# Patient Record
Sex: FEMALE | Race: WHITE
[De-identification: names, ages, dates, MRNs, and addresses within clinical notes are randomized per-mention and may not be internally consistent; named-entity substitution may affect disease eponyms.]

---

## 2017-08-01 ENCOUNTER — HOSPITAL ENCOUNTER (OUTPATIENT)
Dept: HOSPITAL 80 - FIMAGING | Age: 52
End: 2017-08-01
Attending: INTERNAL MEDICINE
Payer: COMMERCIAL

## 2017-08-01 DIAGNOSIS — Z12.31: Primary | ICD-10-CM

## 2017-08-01 PROCEDURE — G0202 SCR MAMMO BI INCL CAD: HCPCS

## 2017-08-29 ENCOUNTER — HOSPITAL ENCOUNTER (OUTPATIENT)
Dept: HOSPITAL 80 - FSGY | Age: 52
Discharge: HOME | End: 2017-08-29
Attending: INTERNAL MEDICINE
Payer: COMMERCIAL

## 2017-08-29 VITALS — DIASTOLIC BLOOD PRESSURE: 84 MMHG | OXYGEN SATURATION: 99 % | SYSTOLIC BLOOD PRESSURE: 136 MMHG

## 2017-08-29 VITALS — HEART RATE: 83 BPM

## 2017-08-29 VITALS — TEMPERATURE: 97.5 F

## 2017-08-29 VITALS — RESPIRATION RATE: 20 BRPM

## 2017-08-29 DIAGNOSIS — D12.5: ICD-10-CM

## 2017-08-29 DIAGNOSIS — D12.2: ICD-10-CM

## 2017-08-29 DIAGNOSIS — K63.5: ICD-10-CM

## 2017-08-29 DIAGNOSIS — D12.3: Primary | ICD-10-CM

## 2017-08-29 PROCEDURE — 0DBL8ZX EXCISION OF TRANSVERSE COLON, VIA NATURAL OR ARTIFICIAL OPENING ENDOSCOPIC, DIAGNOSTIC: ICD-10-PCS | Performed by: INTERNAL MEDICINE

## 2017-08-29 PROCEDURE — 0DBN8ZX EXCISION OF SIGMOID COLON, VIA NATURAL OR ARTIFICIAL OPENING ENDOSCOPIC, DIAGNOSTIC: ICD-10-PCS | Performed by: INTERNAL MEDICINE

## 2017-08-29 PROCEDURE — 0DBK8ZX EXCISION OF ASCENDING COLON, VIA NATURAL OR ARTIFICIAL OPENING ENDOSCOPIC, DIAGNOSTIC: ICD-10-PCS | Performed by: INTERNAL MEDICINE

## 2017-08-29 PROCEDURE — 0D5L8ZZ DESTRUCTION OF TRANSVERSE COLON, VIA NATURAL OR ARTIFICIAL OPENING ENDOSCOPIC: ICD-10-PCS | Performed by: INTERNAL MEDICINE

## 2017-08-29 NOTE — PDANEPAE
ANE History of Present Illness





colonoscopy





ANE Past Medical History





- Cardiovascular History


Hx Hypertension: No


Hx Arrhythmias: No


Hx Chest Pain: No


Hx Coronary Artery / Peripheral Vascular Disease: No


Hx CHF / Valvular Disease: No


Hx Palpitations: No





- Pulmonary History


Hx COPD: Yes


Hx Asthma/Reactive Airway Disease: No


Hx Recent Upper Respiratory Infection: No


Hx Oxygen in Use at Home: No


Hx Sleep Apnea: Yes


Sleep Apnea Screening Result - Last Documented: Positive


Pulmonary History Comment: hx of lung ca 2006 with lobectomy.  chaparrita positive 

uses cpap- instructed pt to bring





- Neurologic History


Hx Cerebrovascular Accident: No


Hx Seizures: No


Hx Dementia: No





- Endocrine History


Hx Diabetes: No


Endocrine History Comment: hypothyroidism





- Renal History


Hx Renal Disorders: No





- Liver History


Hx Hepatic Disorders: No





- Neurological & Psychiatric Hx


Hx Neurological and Psychiatric Disorders: No





- Cancer History


Hx Cancer: Yes


Cancer History Comment: lung ca 2006 with lobectomy and chemo





- Congenital Disorder History


Hx Congenital Disorders: No





- GI History


Hx Gastrointestinal Disorders: Yes


Gastrointestinal History Comment: bouts of diarrhea occ.  recent stomach aches





- Other Health History


Other Health History: wears reading glasses





- Chronic Pain History


Chronic Pain: Yes (back pain from age)





- Surgical History


Prior Surgeries: right lobectomy 2006.  tonsillectomy as child





ANE Review of Systems





- Exercise capacity


METS (RN): 4 METS





ANE Patient History





- Allergies


Allergies/Adverse Reactions: 








No Known Allergies Allergy (Verified 08/18/17 14:40)


 








- Home Medications


Home Medications: 








Levothyroxine [Synthroid 125 mcg (RX)]  12/31/12 [Last Taken 08/29/17 05:00]


Herbals/Supplements -Info Only  08/18/17 [Last Taken 08/24/17]


Hormone Replacement   08/18/17 [Last Taken 1 Day Ago]








- NPO status


NPO Since - Liquids (Date): 08/28/17


NPO Since - Liquids (Time): 23:30


NPO Since - Solids (Date): 08/28/17


NPO Since - Solids (Time): 06:00





- Anes Hx


Anes Hx: no prior problems





- Smoking Hx


Smoking Status: Former smoker





- Family Anes Hx


Family Hx Anesthesia Complications: none





ANE Labs/Vital Signs





- Vital Signs


Blood Pressure: 123/81


Heart Rate: 71


Respiratory Rate: 16


O2 Sat (%): 95


Height: 154.94 cm


Weight: 55.338 kg





ANE Physical Exam





- Airway


Mallampati Score: Class 2


Mouth exam: normal dental/mouth exam





- Pulmonary


Pulmonary: no respiratory distress





- Cardiovascular


Cardiovascular: regular rate and rhythym





- ASA Status


ASA Status: II





ANE Anesthesia Plan


Anesthesia Plan: GA with mask

## 2017-08-29 NOTE — POSTOPPROG
Post Op Note


Date of Operation: 08/29/17


Surgeon: Guille Santos


Pre-op Diagnosis: colon polyp needing removal 


Post-op Diagnosis: colon polyps removed 


Indication: colon polyps


Procedure: colon with bx, snare, injection 


Findings: 20cm flat splenic flexure polyp removed 


Inf/Abcess present in the surg proc area at time of surgery?: No

## 2017-08-29 NOTE — GPN
[f rep st]



                                                                 PROCEDURE NOTE





PREPROCEDURE DIAGNOSIS:  Colon polyps required removal.



POSTPROCEDURE DIAGNOSIS:  Colon polyps status post removal.



PROCEDURE:  Colonoscopy with snare, colonoscopy with biopsy, colonoscopy with injection.



MEDICATIONS:  Monitored anesthesia care.



BIOPSIES:  Yes.



COMPLICATIONS:  None.



BLOOD LOSS:  Minimal.



INDICATIONS:  The patient is a 51-year-old female with a previous colonoscopy in March of this year 
showing a large flat polyp at 50 cm from the anal verge.  The biopsies had shown sessile serrated ad
enoma and tattoo was placed.  She is here today for removal.  The risks and benefits of the procedur
e were discussed with the patient.  Consent obtained.  Risks include, but not limited to, bleeding, 
perforation, risks associated with sedation.  Patient is ASA Class 1.



DESCRIPTION OF PROCEDURE:  The pediatric colonoscope was advanced into the terminal ileum, which maria del rosario
eared normal.  The ileocecal valve and cecum appeared normal.  A 2 mm polyp was removed using cold b
iopsy forceps from the ascending colon and sent to pathology.  The hepatic flexure appeared normal. 
 She had small scattered diverticula in the transverse and descending colon.  Two 2 mm polyps were r
emoved from the transverse colon using cold biopsy forceps and sent to pathology.  The hepatic flexu
re showed a tattoo site, and there was a flat 2 cm polyp along the fold at the splenic flexure.  The
 polyp was difficult to visualize and removed, given its location.  Ultimately, the polyp was best v
isualized with the patient on her back.  I then removed the polyp in piecemeal fashion using both ov
al and high signal hot snare.  The tissue was retrieved for pathology.  The edges of the polyp were 
treated with argon plasma coagulation.  A total of 3 clips were placed over the site to close the de
fect.  A small 1 mm polyp was removed using cold biopsy forceps in the sigmoid colon and sent to [facility].  Retroflexed views in the rectum were normal.



IMPRESSION:  Colon polyps status post removal.  The large polyp at the splenic flexure located at ap
proximately 50 cm from the anal verge was removed in piecemeal fashion.  There was no bleeding durin
g the procedure.



RECOMMENDATIONS:  

1.  Advance diet as tolerated.

2.  Discharge to home with escort.

3.  Follow up the final pathology results.

4.  Repeat colonoscopy in 6 months at the hospital for possible APC given piecemeal polypectomy.

5.  The pathology results are available within 10 days.

6.  Thank you for allowing me to participate in the care of your patient.  Please do not hesitate to
 call with questions.





Job #:  [id number]

## 2017-08-29 NOTE — PDGENHP
History & Physical


Chief Complaint: large descending colon polyp needing removal 


Relevant Physical Exam: GEN: NAD.  Cardiac: RRR.  Lungs: CTA B.  Abd: Soft, nt, 

nd

## 2018-03-30 ENCOUNTER — HOSPITAL ENCOUNTER (OUTPATIENT)
Dept: HOSPITAL 80 - FSGY | Age: 53
Discharge: HOME | End: 2018-03-30
Attending: INTERNAL MEDICINE
Payer: COMMERCIAL

## 2018-03-30 VITALS — SYSTOLIC BLOOD PRESSURE: 117 MMHG | DIASTOLIC BLOOD PRESSURE: 67 MMHG

## 2018-03-30 DIAGNOSIS — D12.5: Primary | ICD-10-CM

## 2018-03-30 PROCEDURE — 0DBN8ZX EXCISION OF SIGMOID COLON, VIA NATURAL OR ARTIFICIAL OPENING ENDOSCOPIC, DIAGNOSTIC: ICD-10-PCS | Performed by: INTERNAL MEDICINE

## 2018-03-30 NOTE — GIREPORT
[facility] hospital

Surgical Services - Endoscopy Department

_____________________________________________________________________________________________________
_______

Patient Name: Maggy Collins                            Procedure Date: 3/30/2018 8:19 AM

MRN: [id number]                                       Account Number: [id number]

Patient Type: Outpatient                             Attending  MD/ ER Physician: Demetris Batista MD

_____________________________________________________________________________________________________
_______

 

Procedure:                    Colonoscopy

Indications:                  High risk colon cancer surveillance: Personal history of colonic polyps


Providers:                    Demetris Batista MD

Medicines:                    Monitored Anesthesia Care

Complications:                No immediate complications.

Description of Procedure:     After obtaining informed consent, the scope was passed under direct vis
ion. 

                              Throughout the procedure, the patient's blood pressure, pulse, and oxyg
en 

                              saturations were monitored continuously. The Colonoscope with irrigatio
n 

                              channel was introduced through the anus and advanced to the terminal il
eum. 

                              The colonoscopy was performed without difficulty. The patient tolerated
 the 

                              procedure well. The quality of the bowel preparation was excellent.

Findings:                     The terminal ileum appeared normal.

                              The descending colon, transverse colon, ascending colon, cecum, appendi
ceal 

                              orifice and ileocecal valve appeared normal.

                              Two sessile polyps were found in the sigmoid colon. The polyps were 3 t
o 4 

                              mm in size. These polyps were removed with a cold biopsy forceps. Resec
tion 

                              and retrieval were complete.

                              The rectum appeared normal.

                              The perianal and digital rectal examinations were normal.

Estimated Blood Loss:         Estimated blood loss: none.

Post Op Diagnosis:            - The examined portion of the ileum was normal.

                              - The descending colon, transverse colon, ascending colon, cecum, 

                              appendiceal orifice and ileocecal valve are normal.

                              - Two 3 to 4 mm polyps in the sigmoid colon, removed with a cold biopsy
 

                              forceps. Resected and retrieved.

                              - The rectum is normal.

Recommendation:               - Discharge patient to home (with spouse).

                              - Patient has a contact number available for emergencies. The signs and
 

                              symptoms of potential delayed complications were discussed with the pat
ient. 

                              Return to normal activities tomorrow. Written discharge instructions we
re 

                              provided to the patient.

                              - Advance diet as tolerated today.

                              - Continue present medications.

                              - Await pathology results.

                              - Repeat colonoscopy in 3 years for surveillance.

Attending Participation:

     I personally performed the entire procedure.

 

Demetris Batista MD

_____________________

Demetris Batista MD

3/30/2018 10:27:22 AM

This report has been signed electronicallyDemetris Batista MD

Number of Addenda: 0

 

Note Initiated On: 3/30/2018 8:19 AM

Total Procedure Duration Time 0 hours 31 minutes 55 seconds 

[email or web address]

## 2018-03-30 NOTE — PDGENHP
History and Physical





- Chief Complaint


Personal history of colon polyps.





- History of Present Illness








History Information





- Allergies/Home Medication List


Allergies/Adverse Reactions: 








No Known Allergies Allergy (Verified 03/13/18 15:08)


 





Home Medications: 








Levothyroxine [Synthroid 125 mcg (RX)]  12/31/12 [Last Taken 08/29/17 05:00]


Herbals/Supplements -Info Only  08/18/17 [Last Taken 08/24/17]


Hormone Replacement   08/18/17 [Last Taken 1 Day Ago ~08/28/17]





I have personally reviewed and updated: family history, medical history, social 

history, surgical history


Past Medical History: Phx of colon polyps.





- Social History


Smoking Status: Former smoker





Review of Systems


Review of Systems: 





ROS: 10pt was reviewed & negative except for what was stated in HPI & below





Physical Exam


Physical Exam: 

















Temp Pulse Resp BP Pulse Ox


 


 36.6 C   67   14   128/75 H  95 


 


 03/30/18 07:44  03/30/18 08:42  03/30/18 08:42  03/30/18 08:42  03/30/18 08:42











Constitutional: no apparent distress, appears nourished


Cardiovascular: regular rate and rhythym, no murmur, rub, or gallop


Respiratory: no respiratory distress


Gastrointestinal: normoactive bowel sounds, soft, non-tender abdomen, no 

palpable masses


Skin: warm, normal color


Neurologic: AAOx3

## 2018-03-30 NOTE — PDANEPAE
ANE History of Present Illness





Colonoscopy








ANE Past Medical History





- Cardiovascular History


Hx Hypertension: No


Hx Arrhythmias: No


Hx Chest Pain: No


Hx Coronary Artery / Peripheral Vascular Disease: No


Hx CHF / Valvular Disease: No


Hx Palpitations: No





- Pulmonary History


Hx COPD: Yes


Hx Asthma/Reactive Airway Disease: No


Hx Recent Upper Respiratory Infection: No


Hx Oxygen in Use at Home: No


Hx Sleep Apnea: Yes


Sleep Apnea Screening Result - Last Documented: Positive


Pulmonary History Comment: hx of lung ca 2006 with lobectomy.  chaparrita positive 

uses cpap- instructed pt to bring





- Neurologic History


Hx Cerebrovascular Accident: No


Hx Seizures: No


Hx Dementia: No





- Endocrine History


Hx Diabetes: No


Endocrine History Comment: hypothyroidism





- Renal History


Hx Renal Disorders: No





- Liver History


Hx Hepatic Disorders: No





- Neurological & Psychiatric Hx


Hx Neurological and Psychiatric Disorders: No





- Cancer History


Hx Cancer: Yes


Cancer History Comment: lung ca 2006 with lobectomy and chemo





- Congenital Disorder History


Hx Congenital Disorders: No





- GI History


Hx Gastrointestinal Disorders: Yes


Gastrointestinal History Comment: recent stomach aches.  cramping and abd pain





- Other Health History


Other Health History: wears reading glasses





- Chronic Pain History


Chronic Pain: Yes (right shoulder pain)





- Surgical History


Prior Surgeries: right lobectomy 2006.  tonsillectomy as child.  Colonoscopy 8/ 17





ANE Review of Systems


Review of Systems: 








- Exercise capacity


METS (RN): 4 METS





ANE Patient History





- Allergies


Allergies/Adverse Reactions: 








No Known Allergies Allergy (Verified 03/13/18 15:08)


 








- Home Medications


Home Medications: 








Levothyroxine [Synthroid 125 mcg (RX)]  12/31/12 [Last Taken 08/29/17 05:00]


Herbals/Supplements -Info Only  08/18/17 [Last Taken 08/24/17]


Hormone Replacement   08/18/17 [Last Taken 1 Day Ago ~08/28/17]








- NPO status


NPO Since - Liquids (Date): 03/29/18


NPO Since - Solids (Date): 03/29/18





- Smoking Hx


Smoking Status: Former smoker





- Family Anes Hx


Family Hx Anesthesia Complications: none





ANE Labs/Vital Signs





- Vital Signs


Blood Pressure: 128/75


Heart Rate: 67


Respiratory Rate: 14


O2 Sat (%): 95


Height: 154.94 cm


Weight: 54.431 kg





ANE Physical Exam





- Airway


Neck exam: FROM


Mallampati Score: Class 2


Mouth exam: normal dental/mouth exam





- Pulmonary


Pulmonary: clear to auscultation





- Cardiovascular


Cardiovascular: regular rate and rhythym





- ASA Status


ASA Status: II





ANE Anesthesia Plan


Anesthesia Plan: GA with mask

## 2018-09-21 ENCOUNTER — HOSPITAL ENCOUNTER (OUTPATIENT)
Dept: HOSPITAL 80 - FIMAGING | Age: 53
End: 2018-09-21
Attending: INTERNAL MEDICINE
Payer: COMMERCIAL

## 2018-09-21 DIAGNOSIS — Z12.31: Primary | ICD-10-CM

## 2019-02-16 ENCOUNTER — HOSPITAL ENCOUNTER (OUTPATIENT)
Dept: HOSPITAL 80 - CIMAGING | Age: 54
End: 2019-02-16
Attending: INTERNAL MEDICINE
Payer: COMMERCIAL

## 2019-02-16 DIAGNOSIS — Z13.6: Primary | ICD-10-CM

## 2019-03-29 ENCOUNTER — HOSPITAL ENCOUNTER (OUTPATIENT)
Dept: HOSPITAL 80 - FIMAGING | Age: 54
End: 2019-03-29
Attending: RADIOLOGY
Payer: COMMERCIAL

## 2019-03-29 DIAGNOSIS — M75.31: Primary | ICD-10-CM

## 2019-03-29 PROCEDURE — 0R9J3ZX DRAINAGE OF RIGHT SHOULDER JOINT, PERCUTANEOUS APPROACH, DIAGNOSTIC: ICD-10-PCS | Performed by: RADIOLOGY
